# Patient Record
Sex: MALE | Race: WHITE | Employment: FULL TIME | URBAN - METROPOLITAN AREA
[De-identification: names, ages, dates, MRNs, and addresses within clinical notes are randomized per-mention and may not be internally consistent; named-entity substitution may affect disease eponyms.]

---

## 2017-08-17 ENCOUNTER — HOSPITAL ENCOUNTER (OUTPATIENT)
Dept: PHYSICAL THERAPY | Age: 47
Discharge: HOME | End: 2017-08-17
Payer: COMMERCIAL

## 2017-08-17 PROCEDURE — 97161 PT EVAL LOW COMPLEX 20 MIN: CPT

## 2017-08-17 NOTE — PROGRESS NOTES
Ambulatory/Rehab Services H2 Model Falls Risk Assessment    Risk Factor Pts. ·   Confusion/Disorientation/Impulsivity  []    4 ·   Symptomatic Depression  []   2 ·   Altered Elimination  []   1 ·   Dizziness/Vertigo  []   1 ·   Gender (Male)  [x]   1 ·   Any administered antiepileptics (anticonvulsants):  []   2 ·   Any administered benzodiazepines:  []   1 ·   Visual Impairment (specify):  []   1 ·   Portable Oxygen Use  []   1 ·   Orthostatic ? BP  []   1 ·   History of Recent Falls (within 3 mos.)  []   5     Ability to Rise from Chair (choose one) Pts. ·   Ability to rise in a single movement  [x]   0 ·   Pushes up, successful in one attempt  []   1 ·   Multiple attempts, but successful  []   3 ·   Unable to rise without assistance  []   4   Total: (5 or greater = High Risk) 1     Falls Prevention Plan:   []                Physical Limitations to Exercise (specify):   []                Mobility Assistance Device (type):   []                Exercise/Equipment Adaptation (specify):    ©2010 [facility] of [address] Patent #[id number].  Federal Law prohibits the replication, distribution or use without written permission from [facility] "[employer]

## 2017-08-22 ENCOUNTER — HOSPITAL ENCOUNTER (OUTPATIENT)
Dept: PHYSICAL THERAPY | Age: 47
Discharge: HOME | End: 2017-08-22
Payer: COMMERCIAL

## 2017-08-22 PROCEDURE — 97140 MANUAL THERAPY 1/> REGIONS: CPT

## 2017-08-22 PROCEDURE — 97110 THERAPEUTIC EXERCISES: CPT

## 2017-08-22 NOTE — PROGRESS NOTES
[place]  : 1970 [facility] at [address]  [phone]   [phone]       OUTPATIENT PHYSICAL THERAPY:Daily Note 2017    ICD-10: Treatment Diagnosis: low back pain (M54.5); Spondylosis without myelopathy or radiculopathy, lumbar region  Precautions/Allergies:   Review of patient's allergies indicates no known allergies. Fall Risk Score: 1 (? 5 = High Risk)  MD Orders: evaluate and treat  MEDICAL/REFERRING DIAGNOSIS:  Other intervertebral disc degeneration, lumbar region [M51.36]   DATE OF ONSET: May 2016   REFERRING PHYSICIAN: Cherelle Duke MD  RETURN PHYSICIAN APPOINTMENT: mid-September      INITIAL ASSESSMENT:  Mr. Patricia Hernandez is a  48year old male with low back pain that began in May 2016 with no specific mechanism or injury. He presents to PT with extension sensitive low back pain with decreased hip flexor mobility and decreased lumbar joint mobility. As a result, he reports pain with bending over and squatting activities. He requires skilled PT to address above listed impairments and functional limitations to facilitate return to prior level of function. PROBLEM LIST (Impacting functional limitations):  1. Increased Pain  2. Decreased Activity Tolerance  3. Decreased Flexibility/Joint Mobility  4. Decreased [place] with Home Exercise Program INTERVENTIONS PLANNED:  1. Cryotherapy  2. Heat  3. Home Exercise Program (HEP)  4. Neuromuscular Re-education/Strengthening  5. Range of Motion (ROM)  6. Therapeutic Activites  7. Therapeutic Exercise/Strengthening   TREATMENT PLAN:  Effective Dates: 17 TO 17. Frequency/Duration: 2 times a week for 6 weeks  GOALS: (Goals have been discussed and agreed upon with patient.)  Discharge Goals: Time Frame: 17  1. Pt will report minimal to no pain (0-1/10) upon waking in the AM with performance of his HEP. ONGOING  2.  Pt will report no limitation due to pain with bending or squatting to perform hobby activities (planing wood) or dressing (putting on pants). ONGOING  3. Pt will demonstrate functional improvement with MARIA INES to 6% or less. ONGOING  Rehabilitation Potential For Stated Goals: Good  Regarding Zulma Ordonez therapy, I certify that the treatment plan above will be carried out by a therapist or under their direction. Thank you for this referral,  Malathi Mcelroy, ROBYN     Referring Physician Signature: Shane Wilder MD              Date                      HISTORY:   History of Present Injury/Illness (Reason for Referral):  8/17/17: Pt reports low back pain that began in May 2016 with no specific mechanism or injury. He reports having tried increasing activity level with beginning an interval workout program and increasing his core exercises with no improvement in pain. He reports pain is worst when he wakes in the AM and with bending/squatting activities. He reports that pain in the AM (4/10) improves by about 9:00 am but that he has trouble bending over to put his pants on as a result of his pain. He reports that pain decreases to 0-1/10 during the day unless he performs an aggravating activity such as bending down to perform wood planing and then it takes 1 hour to aggravate pain and about 20-30 minutes to alleviate after he stops the activity. He reports no increased symptoms with workout, running, or work activities. Past Medical History/Comorbidities:   Mr. Huy Hendrickson  has a past medical history of GERD (gastroesophageal reflux disease) and RLS (restless legs syndrome). Mr. Huy Hendrickson  has a past surgical history that includes vasectomy (2005); heent (1998); and refractive surgery. Social History/Living Environment:     Pt lives in private home with spouse and 4 kids. Prior Level of Function/Work/Activity:  Pt works as  with job requiring desk work and walking around.    Current Medications: Current Outpatient Prescriptions:     sertraline (ZOLOFT) 50 mg tablet, Take 1 Tab by mouth daily. , Disp: 30 Tab, Rfl: 3    clonazePAM (KLONOPIN) 0.5 mg tablet, 1 tab PO BID Prn restless legs, Disp: 60 Tab, Rfl: 5    pramipexole (MIRAPEX) 0.5 mg tablet, Take 1 Tab by mouth three (3) times daily. , Disp: 90 Tab, Rfl: 1    meloxicam (MOBIC) 15 mg tablet, Take 1 Tab by mouth daily. , Disp: 30 Tab, Rfl: 1    selenium sulfide (SELSUN BLUE) 1 % shampoo, Use as needed for scalp, Disp: 325 mL, Rfl: 5   Date Last Reviewed:  8/22/2017     Number of Personal Factors/Comorbidities that affect the Plan of Care: 0: LOW COMPLEXITY   EXAMINATION:   Observation/Palpation: Hypomobile and painful with PA  To L3-5. Decreased hip flexor length bilaterally with reproduction of pain during testing     OM:       Flexion WNL--pain that decreased with repetition   Extension WNL--pain that increased with repetition   SBL WNL   SBR WNL   Rot L WNL   Rot R  WNL     Strength: WNL LQS, Poor core control demonstrated during squat and demonstration of cat/camel     Neurological Screen: Reflexes not tested , SLR negative , Slump negative     (SB=sidebending, Rot=rotation, TTP=tender to palpation, SLR=straight leg raise, LQS=lower quarter scan, WNL=within normal limits; ROM measured in degrees)     Body Structures Involved:  1. Joints  2. Muscles Body Functions Affected:  1. Sensory/Pain  2. Neuromusculoskeletal  3. Movement Related Activities and Participation Affected:  1. Learning and Applying Knowledge  2. General Tasks and Demands  3. Self Care   Number of elements (examined above) that affect the Plan of Care: 4+: HIGH COMPLEXITY   CLINICAL PRESENTATION:   Presentation: Stable and uncomplicated: LOW COMPLEXITY   CLINICAL DECISION MAKING:   Outcome Measure:    Tool Used: Modified Oswestry Low Back Pain Questionnaire  Score:  Initial: 7/50 (14% disability)   Most Recent: X/50 (Date: -- )   Interpretation of Score: Each section is scored on a 0-5 scale, 5 representing the greatest disability. The scores of each section are added together for a total score of 50. Medical Necessity:   · Patient is expected to demonstrate progress in strength and range of motion to increase independence with bending . Reason for Services/Other Comments:  · Patient continues to require present interventions due to patient's inability to bend and lift without pain . Use of outcome tool(s) and clinical judgement create a POC that gives a: Clear prediction of patient's progress: LOW COMPLEXITY            TREATMENT:   (In addition to Assessment/Re-Assessment sessions the following treatments were rendered)  Pre-treatment Symptoms/Complaints:  Pt reports that he has not noticed much difference in his back pain yet and has been doing his stretches. Pain: Initial: Pain Intensity 1: 0  Post Session:  0/10      Manual Therapy (    Soft Tissue Mobilization Duration  Duration: 10 Minutes): Manual techniques to facilitate improved motion and decreased pain. · Long axis traction each side     Therapeutic Exercise: (40 Minutes):  Exercises per grid below to improve mobility and strength. Required minimal verbal cues to promote proper body mechanics. Progressed resistance, range and repetitions as indicated. 8/17/17 8/22/17    Activity/Exercise Parameters               Parameters Parameters   Patient education Plan of care, expectations, HEP with KTC, LE rocking, and hip flexor stretch  --                                                      Bike  -- 5 minutes     Lumbar ROM  -- KTC, LE rocking, cat/camel, child's pose     Posterior pelvic tilt (PPT)  -- 2 x 10 regular, 2 x 10 on dynadisc     Marching  -- With PPT and with heel slides     Hip hinge/squat -- 2 x 10 each     Hip flexor stretch  -- Half kneeling, 3 x 30 sec each         Treatment/Session Assessment:    · Response to Treatment: Pt with good tolerance with initiation of flexion exercises today.    Pt with improved ability to perform cat/camel today compared to last visit, but is still very limited in achieving flexion postures. Plan to continue to facilitate as tolerated. · Compliance with Program/Exercises: compliant most of the time. · Recommendations/Intent for next treatment session: \"Next visit will focus on advancements to more challenging activities\".   Total Treatment Duration:  PT Patient Time In/Time Out  Time In: 0830  Time Out: 0920    Tyrone Agustin PT

## 2017-08-24 ENCOUNTER — HOSPITAL ENCOUNTER (OUTPATIENT)
Dept: PHYSICAL THERAPY | Age: 47
Discharge: HOME | End: 2017-08-24
Payer: COMMERCIAL

## 2017-08-24 PROCEDURE — 97110 THERAPEUTIC EXERCISES: CPT

## 2017-08-24 NOTE — PROGRESS NOTES
[place]  : 1970 [facility] Center at [address] [place] [address]  [phone]   [phone]       OUTPATIENT PHYSICAL THERAPY:Daily Note 2017    ICD-10: Treatment Diagnosis: low back pain (M54.5); Spondylosis without myelopathy or radiculopathy, lumbar region  Precautions/Allergies:   Review of patient's allergies indicates no known allergies. Fall Risk Score: 1 (? 5 = High Risk)  MD Orders: evaluate and treat  MEDICAL/REFERRING DIAGNOSIS:  Other intervertebral disc degeneration, lumbar region [M51.36]   DATE OF ONSET: May 2016   REFERRING PHYSICIAN: Yuli Dang MD  RETURN PHYSICIAN APPOINTMENT: mid-September      INITIAL ASSESSMENT:  Mr. Mercedes Hughes is a  48year old male with low back pain that began in May 2016 with no specific mechanism or injury. He presents to PT with extension sensitive low back pain with decreased hip flexor mobility and decreased lumbar joint mobility. As a result, he reports pain with bending over and squatting activities. He requires skilled PT to address above listed impairments and functional limitations to facilitate return to prior level of function. PROBLEM LIST (Impacting functional limitations):  1. Increased Pain  2. Decreased Activity Tolerance  3. Decreased Flexibility/Joint Mobility  4. Decreased [place] with Home Exercise Program INTERVENTIONS PLANNED:  1. Cryotherapy  2. Heat  3. Home Exercise Program (HEP)  4. Neuromuscular Re-education/Strengthening  5. Range of Motion (ROM)  6. Therapeutic Activites  7. Therapeutic Exercise/Strengthening   TREATMENT PLAN:  Effective Dates: 17 TO 17. Frequency/Duration: 2 times a week for 6 weeks  GOALS: (Goals have been discussed and agreed upon with patient.)  Discharge Goals: Time Frame: 17  1. Pt will report minimal to no pain (0-1/10) upon waking in the AM with performance of his HEP. ONGOING  2.  Pt will report no limitation due to pain with bending or squatting to perform hobby activities (planing wood) or dressing (putting on pants). ONGOING  3. Pt will demonstrate functional improvement with MARIA INES to 6% or less. ONGOING  Rehabilitation Potential For Stated Goals: Good  Regarding Tabatha Cheese therapy, I certify that the treatment plan above will be carried out by a therapist or under their direction. Thank you for this referral,  Ashwini Lockett PT     Referring Physician Signature: Martha Pak MD              Date                      HISTORY:   History of Present Injury/Illness (Reason for Referral):  8/17/17: Pt reports low back pain that began in May 2016 with no specific mechanism or injury. He reports having tried increasing activity level with beginning an interval workout program and increasing his core exercises with no improvement in pain. He reports pain is worst when he wakes in the AM and with bending/squatting activities. He reports that pain in the AM (4/10) improves by about 9:00 am but that he has trouble bending over to put his pants on as a result of his pain. He reports that pain decreases to 0-1/10 during the day unless he performs an aggravating activity such as bending down to perform wood planing and then it takes 1 hour to aggravate pain and about 20-30 minutes to alleviate after he stops the activity. He reports no increased symptoms with workout, running, or work activities. Past Medical History/Comorbidities:   Mr. Paulina Clark  has a past medical history of GERD (gastroesophageal reflux disease) and RLS (restless legs syndrome). Mr. Paulina Clark  has a past surgical history that includes vasectomy (2005); heent (1998); and refractive surgery. Social History/Living Environment:     Pt lives in private home with spouse and 4 kids. Prior Level of Function/Work/Activity:  Pt works as  with job requiring desk work and walking around.    Current Medications: Current Outpatient Prescriptions:     sertraline (ZOLOFT) 50 mg tablet, Take 1 Tab by mouth daily. , Disp: 30 Tab, Rfl: 3    clonazePAM (KLONOPIN) 0.5 mg tablet, 1 tab PO BID Prn restless legs, Disp: 60 Tab, Rfl: 5    pramipexole (MIRAPEX) 0.5 mg tablet, Take 1 Tab by mouth three (3) times daily. , Disp: 90 Tab, Rfl: 1    meloxicam (MOBIC) 15 mg tablet, Take 1 Tab by mouth daily. , Disp: 30 Tab, Rfl: 1    selenium sulfide (SELSUN BLUE) 1 % shampoo, Use as needed for scalp, Disp: 325 mL, Rfl: 5   Date Last Reviewed:  8/24/2017     Number of Personal Factors/Comorbidities that affect the Plan of Care: 0: LOW COMPLEXITY   EXAMINATION:   Observation/Palpation: Hypomobile and painful with PA  To L3-5. Decreased hip flexor length bilaterally with reproduction of pain during testing     OM:       Flexion WNL--pain that decreased with repetition   Extension WNL--pain that increased with repetition   SBL WNL   SBR WNL   Rot L WNL   Rot R  WNL     Strength: WNL LQS, Poor core control demonstrated during squat and demonstration of cat/camel     Neurological Screen: Reflexes not tested , SLR negative , Slump negative     (SB=sidebending, Rot=rotation, TTP=tender to palpation, SLR=straight leg raise, LQS=lower quarter scan, WNL=within normal limits; ROM measured in degrees)     Body Structures Involved:  1. Joints  2. Muscles Body Functions Affected:  1. Sensory/Pain  2. Neuromusculoskeletal  3. Movement Related Activities and Participation Affected:  1. Learning and Applying Knowledge  2. General Tasks and Demands  3. Self Care   Number of elements (examined above) that affect the Plan of Care: 4+: HIGH COMPLEXITY   CLINICAL PRESENTATION:   Presentation: Stable and uncomplicated: LOW COMPLEXITY   CLINICAL DECISION MAKING:   Outcome Measure:    Tool Used: Modified Oswestry Low Back Pain Questionnaire  Score:  Initial: 7/50 (14% disability)   Most Recent: X/50 (Date: -- )   Interpretation of Score: Each section is scored on a 0-5 scale, 5 representing the greatest disability. The scores of each section are added together for a total score of 50. Medical Necessity:   · Patient is expected to demonstrate progress in strength and range of motion to increase independence with bending . Reason for Services/Other Comments:  · Patient continues to require present interventions due to patient's inability to bend and lift without pain . Use of outcome tool(s) and clinical judgement create a POC that gives a: Clear prediction of patient's progress: LOW COMPLEXITY            TREATMENT:   (In addition to Assessment/Re-Assessment sessions the following treatments were rendered)  Pre-treatment Symptoms/Complaints:  Pt reports that he is doing okay this AM and that he has to leave early today due to a work conflict. Pain: Initial: Pain Intensity 1: 0  Post Session:  0/10      Manual Therapy (    Soft Tissue Mobilization Duration  Duration: 10 Minutes): Manual techniques to facilitate improved motion and decreased pain. · Long axis traction each side     Therapeutic Exercise: (10 Minutes):  Exercises per grid below to improve mobility and strength. Required minimal verbal cues to promote proper body mechanics. Progressed resistance, range and repetitions as indicated.      8/17/17 8/22/17 8/24/17   Activity/Exercise Parameters               Parameters Parameters   Patient education Plan of care, expectations, HEP with KTC, LE rocking, and hip flexor stretch  --                                                   Reviewed squat form    Bike  -- 5 minutes  4 minutes    Lumbar ROM  -- KTC, LE rocking, cat/camel, child's pose  Child's pose, quadruped rotation    Posterior pelvic tilt (PPT)  -- 2 x 10 regular, 2 x 10 on dynadisc  5 reps alone and 3 x 10 with ball rolls    Marching  -- With PPT and with heel slides  --   Hip hinge/squat -- 2 x 10 each  --   Hip flexor stretch  -- Half kneeling, 3 x 30 sec each  --       Treatment/Session Assessment:    · Response to Treatment: Pt with shortened treatment today due to work conflict. Good maintenance of ability to perform posterior pelvic tilt. · Compliance with Program/Exercises: compliant most of the time. · Recommendations/Intent for next treatment session: \"Next visit will focus on advancements to more challenging activities\".   Total Treatment Duration:  PT Patient Time In/Time Out  Time In: 0830  Time Out: 1835    Mar Wang, PT

## 2017-08-30 ENCOUNTER — HOSPITAL ENCOUNTER (OUTPATIENT)
Dept: PHYSICAL THERAPY | Age: 47
Discharge: HOME | End: 2017-08-30
Payer: COMMERCIAL

## 2017-08-30 PROCEDURE — 97110 THERAPEUTIC EXERCISES: CPT

## 2017-08-30 PROCEDURE — 97140 MANUAL THERAPY 1/> REGIONS: CPT

## 2017-08-30 NOTE — PROGRESS NOTES
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 Fall Risk Score: 1 (? 5 = High Risk)  MD Orders: evaluate and treat  MEDICAL/REFERRING DIAGNOSIS:  Other intervertebral disc degeneration, lumbar region [M51.36]   DATE OF ONSET: May 2016   REFERRING PHYSICIAN: Xenia Dominguez MD  RETURN PHYSICIAN APPOINTMENT: mid-September      INITIAL ASSESSMENT:  Mr. Eddie Larkin is a  48year old male with low back pain that began in May 2016 with no specific mechanism or injury. He presents to PT with extension sensitive low back pain with decreased hip flexor mobility and decreased lumbar joint mobility. As a result, he reports pain with bending over and squatting activities. He requires skilled PT to address above listed impairments and functional limitations to facilitate return to prior level of function. PROBLEM LIST (Impacting functional limitations):  1. Increased Pain  2. Decreased Activity Tolerance  3. Decreased Flexibility/Joint Mobility  4. Decreased [place] with Home Exercise Program INTERVENTIONS PLANNED:  1. Cryotherapy  2. Heat  3. Home Exercise Program (HEP)  4. Neuromuscular Re-education/Strengthening  5. Range of Motion (ROM)  6. Therapeutic Activites  7. Therapeutic Exercise/Strengthening   TREATMENT PLAN:  Effective Dates: 17 TO 17. Frequency/Duration: 2 times a week for 6 weeks  GOALS: (Goals have been discussed and agreed upon with patient.)  Discharge Goals: Time Frame: 17  1. Pt will report minimal to no pain (0-1/10) upon waking in the AM with performance of his HEP. ONGOING  2.  Pt will report no limitation due to pain with bending or squatting to perform hobby activities (planing wood) or dressing (putting on pants). ONGOING  3. Pt will demonstrate functional improvement with MARIA INES to 6% or less. ONGOING  Rehabilitation Potential For Stated Goals: Good  Regarding Cam[facility] York therapy, I certify that the treatment plan above will be carried out by a therapist or under their direction. Thank you for this referral,  Isabela Clemons, PT     Referring Physician Signature: Bebo Pemberton MD              Date                      HISTORY:   History of Present Injury/Illness (Reason for Referral):  8/17/17: Pt reports low back pain that began in May 2016 with no specific mechanism or injury. He reports having tried increasing activity level with beginning an interval workout program and increasing his core exercises with no improvement in pain. He reports pain is worst when he wakes in the AM and with bending/squatting activities. He reports that pain in the AM (4/10) improves by about 9:00 am but that he has trouble bending over to put his pants on as a result of his pain. He reports that pain decreases to 0-1/10 during the day unless he performs an aggravating activity such as bending down to perform wood planing and then it takes 1 hour to aggravate pain and about 20-30 minutes to alleviate after he stops the activity. He reports no increased symptoms with workout, running, or work activities. Past Medical History/Comorbidities:   Mr. Malik Cleaning  has a past medical history of GERD (gastroesophageal reflux disease) and RLS (restless legs syndrome). Mr. Malik Cleaning  has a past surgical history that includes vasectomy (2005); heent (1998); and refractive surgery. Social History/Living Environment:     Pt lives in private home with spouse and 4 kids. Prior Level of Function/Work/Activity:  Pt works as  with job requiring desk work and walking around.    Current Medications: Current Outpatient Prescriptions:     sertraline (ZOLOFT) 50 mg tablet, Take 1 Tab by mouth daily. , Disp: 30 Tab, Rfl: 3    clonazePAM (KLONOPIN) 0.5 mg tablet, 1 tab PO BID Prn restless legs, Disp: 60 Tab, Rfl: 5    pramipexole (MIRAPEX) 0.5 mg tablet, Take 1 Tab by mouth three (3) times daily. , Disp: 90 Tab, Rfl: 1    meloxicam (MOBIC) 15 mg tablet, Take 1 Tab by mouth daily. , Disp: 30 Tab, Rfl: 1    selenium sulfide (SELSUN BLUE) 1 % shampoo, Use as needed for scalp, Disp: 325 mL, Rfl: 5   Date Last Reviewed:  8/30/2017     Number of Personal Factors/Comorbidities that affect the Plan of Care: 0: LOW COMPLEXITY   EXAMINATION:   Observation/Palpation: Hypomobile and painful with PA  To L3-5. Decreased hip flexor length bilaterally with reproduction of pain during testing     OM:       Flexion WNL--pain that decreased with repetition   Extension WNL--pain that increased with repetition   SBL WNL   SBR WNL   Rot L WNL   Rot R  WNL     Strength: WNL LQS, Poor core control demonstrated during squat and demonstration of cat/camel     Neurological Screen: Reflexes not tested , SLR negative , Slump negative     (SB=sidebending, Rot=rotation, TTP=tender to palpation, SLR=straight leg raise, LQS=lower quarter scan, WNL=within normal limits; ROM measured in degrees)     Body Structures Involved:  1. Joints  2. Muscles Body Functions Affected:  1. Sensory/Pain  2. Neuromusculoskeletal  3. Movement Related Activities and Participation Affected:  1. Learning and Applying Knowledge  2. General Tasks and Demands  3. Self Care   Number of elements (examined above) that affect the Plan of Care: 4+: HIGH COMPLEXITY   CLINICAL PRESENTATION:   Presentation: Stable and uncomplicated: LOW COMPLEXITY   CLINICAL DECISION MAKING:   Outcome Measure:    Tool Used: Modified Oswestry Low Back Pain Questionnaire  Score:  Initial: 7/50 (14% disability)   Most Recent: X/50 (Date: -- )   Interpretation of Score: Each section is scored on a 0-5 scale, 5 representing the greatest disability. The scores of each section are added together for a total score of 50. Medical Necessity:   · Patient is expected to demonstrate progress in strength and range of motion to increase independence with bending . Reason for Services/Other Comments:  · Patient continues to require present interventions due to patient's inability to bend and lift without pain . Use of outcome tool(s) and clinical judgement create a POC that gives a: Clear prediction of patient's progress: LOW COMPLEXITY            TREATMENT:   (In addition to Assessment/Re-Assessment sessions the following treatments were rendered)  Pre-treatment Symptoms/Complaints:  Pt reports that he has not noticed stiffness in his back the last few days. Pain: Initial: Pain Intensity 1: 0  Post Session:  0/10      Manual Therapy (    Soft Tissue Mobilization Duration  Duration: 10 Minutes): Manual techniques to facilitate improved motion and decreased pain. · Long axis traction each side     Therapeutic Exercise: (50 Minutes):  Exercises per grid below to improve mobility and strength. Required minimal verbal cues to promote proper body mechanics. Progressed resistance, range and repetitions as indicated.      8/24/17 8/30/17    Activity/Exercise Parameters     Patient education Reviewed squat form  --    Bike  4 minutes  5 minutes     Lumbar ROM  Child's pose, quadruped rotation  Child's pose, cat/camel, KTC    Posterior pelvic tilt (PPT)  5 reps alone and 3 x 10 with ball rolls  10 reps alone, 2 x 10 with ball rolls     Marching  -- 10 marching, 10 each side SLR    Hip hinge/squat -- 10 hip hinge with cuing, 10 without, 2 x 10 squat without cuing    Hip flexor stretch  -- 3 x 30 sec each side     SLR abduction  -- 2 x 10 each side     Multifidus walkouts  -- Total gym 5 lbs: 2 x 10 each side     Multifidus rotations  -- Total gym 5 lbs: 2 x 10 each side    Sidelying rotational stretch  -- 2 minutes each side         Treatment/Session Assessment:    · Response to Treatment: Pt with some difficulty remembering how to perform posterior pelvic tilt in supine but improved in cat/camel position. Good progression of symptoms. · Compliance with Program/Exercises: compliant most of the time. · Recommendations/Intent for next treatment session: \"Next visit will focus on advancements to more challenging activities\".   Total Treatment Duration:  PT Patient Time In/Time Out  Time In: 0830  Time Out: 0930    Isabela Clemons, PT

## 2017-09-07 ENCOUNTER — HOSPITAL ENCOUNTER (OUTPATIENT)
Dept: PHYSICAL THERAPY | Age: 47
Discharge: HOME | End: 2017-09-07

## 2017-09-07 NOTE — PROGRESS NOTES
Valeriano Hammer   (:1970) [facility] at  [facility] [facility]  [phone]  [phone]           DATE: 2017    Patient canceled for appointment today due to conflict with doctor's appointment. Will plan to follow up on next scheduled visit.       Lion Guardado, PT, DPT, OCS

## 2017-09-11 ENCOUNTER — APPOINTMENT (OUTPATIENT)
Dept: PHYSICAL THERAPY | Age: 47
End: 2017-09-11

## 2017-10-09 NOTE — PROGRESS NOTES
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 Fall Risk Score: 1 (? 5 = High Risk)  MD Orders: evaluate and treat  MEDICAL/REFERRING DIAGNOSIS:  Other intervertebral disc degeneration, lumbar region [M51.36]   DATE OF ONSET: May 2016   REFERRING PHYSICIAN: Vi Sepulveda MD  RETURN PHYSICIAN APPOINTMENT: mid-September      INITIAL ASSESSMENT:  Mr. Wilmer Calderon is a  48year old male with low back pain that began in May 2016 with no specific mechanism or injury. He presents to PT with extension sensitive low back pain with decreased hip flexor mobility and decreased lumbar joint mobility. As a result, he reports pain with bending over and squatting activities. He requires skilled PT to address above listed impairments and functional limitations to facilitate return to prior level of function. 10/9/17: Following attending 4 therapy visits, Mr Wilmer Calderon discontinued attending therapy. As a result, he is discharged without achieving his therapy goals. PROBLEM LIST (Impacting functional limitations):  1. Increased Pain  2. Decreased Activity Tolerance  3. Decreased Flexibility/Joint Mobility  4. Decreased [place] with Home Exercise Program INTERVENTIONS PLANNED:  1. Cryotherapy  2. Heat  3. Home Exercise Program (HEP)  4. Neuromuscular Re-education/Strengthening  5. Range of Motion (ROM)  6. Therapeutic Activites  7. Therapeutic Exercise/Strengthening   TREATMENT PLAN:  Effective Dates: 17 TO 17.   Frequency/Duration: 2 times a week for 6 weeks  GOALS: (Goals have been discussed and agreed upon with patient.)  Discharge Goals: Time Frame: 9/28/17  1. Pt will report minimal to no pain (0-1/10) upon waking in the AM with performance of his HEP. Did not achieve   2. Pt will report no limitation due to pain with bending or squatting to perform hobby activities (planing wood) or dressing (putting on pants). Did not achieve   3. Pt will demonstrate functional improvement with MARIA INES to 6% or less. Did not achieve   Rehabilitation Potential For Stated Goals: Good              HISTORY:   History of Present Injury/Illness (Reason for Referral):  8/17/17: Pt reports low back pain that began in May 2016 with no specific mechanism or injury. He reports having tried increasing activity level with beginning an interval workout program and increasing his core exercises with no improvement in pain. He reports pain is worst when he wakes in the AM and with bending/squatting activities. He reports that pain in the AM (4/10) improves by about 9:00 am but that he has trouble bending over to put his pants on as a result of his pain. He reports that pain decreases to 0-1/10 during the day unless he performs an aggravating activity such as bending down to perform wood planing and then it takes 1 hour to aggravate pain and about 20-30 minutes to alleviate after he stops the activity. He reports no increased symptoms with workout, running, or work activities. Past Medical History/Comorbidities:   Mr. Maryan Ham  has a past medical history of GERD (gastroesophageal reflux disease) and RLS (restless legs syndrome). Mr. Maryan Ham  has a past surgical history that includes vasectomy (2005); heent (1998); and refractive surgery. Social History/Living Environment:     Pt lives in private home with spouse and 4 kids. Prior Level of Function/Work/Activity:  Pt works as  with job requiring desk work and walking around.    Current Medications:       Current Outpatient Prescriptions:     sertraline (ZOLOFT) 50 mg tablet, Take 1 Tab by mouth daily. , Disp: 30 Tab, Rfl: 3    clonazePAM (KLONOPIN) 0.5 mg tablet, 1 tab PO BID Prn restless legs, Disp: 60 Tab, Rfl: 5    pramipexole (MIRAPEX) 0.5 mg tablet, Take 1 Tab by mouth three (3) times daily. , Disp: 90 Tab, Rfl: 1    meloxicam (MOBIC) 15 mg tablet, Take 1 Tab by mouth daily. , Disp: 30 Tab, Rfl: 1    selenium sulfide (SELSUN BLUE) 1 % shampoo, Use as needed for scalp, Disp: 325 mL, Rfl: 5   Date Last Reviewed:  10/9/2017     Number of Personal Factors/Comorbidities that affect the Plan of Care: 0: LOW COMPLEXITY   EXAMINATION:   Observation/Palpation: Hypomobile and painful with PA  To L3-5. Decreased hip flexor length bilaterally with reproduction of pain during testing     OM:       Flexion WNL--pain that decreased with repetition   Extension WNL--pain that increased with repetition   SBL WNL   SBR WNL   Rot L WNL   Rot R  WNL     Strength: WNL LQS, Poor core control demonstrated during squat and demonstration of cat/camel     Neurological Screen: Reflexes not tested , SLR negative , Slump negative     (SB=sidebending, Rot=rotation, TTP=tender to palpation, SLR=straight leg raise, LQS=lower quarter scan, WNL=within normal limits; ROM measured in degrees)     Body Structures Involved:  1. Joints  2. Muscles Body Functions Affected:  1. Sensory/Pain  2. Neuromusculoskeletal  3. Movement Related Activities and Participation Affected:  1. Learning and Applying Knowledge  2. General Tasks and Demands  3. Self Care   Number of elements (examined above) that affect the Plan of Care: 4+: HIGH COMPLEXITY   CLINICAL PRESENTATION:   Presentation: Stable and uncomplicated: LOW COMPLEXITY   CLINICAL DECISION MAKING:   Outcome Measure: Tool Used: Modified Oswestry Low Back Pain Questionnaire  Score:  Initial: 7/50 (14% disability)   Most Recent: X/50 (Date: -- )   Interpretation of Score: Each section is scored on a 0-5 scale, 5 representing the greatest disability.   The scores of each section are added together for a total score of 50. Medical Necessity:   · Patient is expected to demonstrate progress in strength and range of motion to increase independence with bending . Reason for Services/Other Comments:  · Patient continues to require present interventions due to patient's inability to bend and lift without pain .    Use of outcome tool(s) and clinical judgement create a POC that gives a: Clear prediction of patient's progress: LOW COMPLEXITY                 Sly Jackson, PT

## 2023-03-01 ENCOUNTER — APPOINTMENT (RX ONLY)
Dept: URBAN - METROPOLITAN AREA CLINIC 330 | Facility: CLINIC | Age: 53
Setting detail: DERMATOLOGY
End: 2023-03-01

## 2023-03-01 DIAGNOSIS — L57.8 OTHER SKIN CHANGES DUE TO CHRONIC EXPOSURE TO NONIONIZING RADIATION: ICD-10-CM | Status: STABLE

## 2023-03-01 DIAGNOSIS — L73.8 OTHER SPECIFIED FOLLICULAR DISORDERS: ICD-10-CM | Status: STABLE

## 2023-03-01 DIAGNOSIS — L82.1 OTHER SEBORRHEIC KERATOSIS: ICD-10-CM | Status: STABLE

## 2023-03-01 DIAGNOSIS — D22 MELANOCYTIC NEVI: ICD-10-CM | Status: STABLE

## 2023-03-01 DIAGNOSIS — L82.0 INFLAMED SEBORRHEIC KERATOSIS: ICD-10-CM

## 2023-03-01 DIAGNOSIS — Z71.89 OTHER SPECIFIED COUNSELING: ICD-10-CM

## 2023-03-01 PROBLEM — D22.72 MELANOCYTIC NEVI OF LEFT LOWER LIMB, INCLUDING HIP: Status: ACTIVE | Noted: 2023-03-01

## 2023-03-01 PROBLEM — D22.5 MELANOCYTIC NEVI OF TRUNK: Status: ACTIVE | Noted: 2023-03-01

## 2023-03-01 PROCEDURE — 11301 SHAVE SKIN LESION 0.6-1.0 CM: CPT

## 2023-03-01 PROCEDURE — ? LIQUID NITROGEN

## 2023-03-01 PROCEDURE — ? EDUCATIONAL RESOURCES PROVIDED

## 2023-03-01 PROCEDURE — ? ADDITIONAL NOTES

## 2023-03-01 PROCEDURE — 99203 OFFICE O/P NEW LOW 30 MIN: CPT | Mod: 25

## 2023-03-01 PROCEDURE — ? BENIGN DESTRUCTION COSMETIC

## 2023-03-01 PROCEDURE — 17110 DESTRUCTION B9 LES UP TO 14: CPT | Mod: 59

## 2023-03-01 PROCEDURE — ? PRESCRIPTION

## 2023-03-01 PROCEDURE — ? PRESCRIPTION MEDICATION MANAGEMENT

## 2023-03-01 PROCEDURE — ? SHAVE REMOVAL

## 2023-03-01 PROCEDURE — ? TREATMENT REGIMEN

## 2023-03-01 PROCEDURE — ? COUNSELING

## 2023-03-01 PROCEDURE — ? FULL BODY SKIN EXAM

## 2023-03-01 PROCEDURE — ? BODY PHOTOGRAPHY

## 2023-03-01 RX ORDER — TRETIONIN 0.5 MG/G
CREAM TOPICAL
Qty: 45 | Refills: 3 | Status: ERX | COMMUNITY
Start: 2023-03-01

## 2023-03-01 RX ADMIN — TRETIONIN: 0.5 CREAM TOPICAL at 00:00

## 2023-03-01 ASSESSMENT — LOCATION SIMPLE DESCRIPTION DERM
LOCATION SIMPLE: LEFT CHEEK
LOCATION SIMPLE: RIGHT FOREHEAD
LOCATION SIMPLE: RIGHT UPPER BACK
LOCATION SIMPLE: RIGHT CHEEK
LOCATION SIMPLE: LEFT THIGH
LOCATION SIMPLE: LEFT FOREHEAD
LOCATION SIMPLE: UPPER BACK
LOCATION SIMPLE: LEFT UPPER BACK
LOCATION SIMPLE: GROIN

## 2023-03-01 ASSESSMENT — LOCATION ZONE DERM
LOCATION ZONE: TRUNK
LOCATION ZONE: LEG
LOCATION ZONE: FACE

## 2023-03-01 ASSESSMENT — LOCATION DETAILED DESCRIPTION DERM
LOCATION DETAILED: LEFT INFERIOR CENTRAL MALAR CHEEK
LOCATION DETAILED: LEFT MEDIAL UPPER BACK
LOCATION DETAILED: SUPRAPUBIC SKIN
LOCATION DETAILED: RIGHT SUPERIOR UPPER BACK
LOCATION DETAILED: LEFT ANTERIOR DISTAL THIGH
LOCATION DETAILED: RIGHT CENTRAL MALAR CHEEK
LOCATION DETAILED: INFERIOR THORACIC SPINE
LOCATION DETAILED: RIGHT LATERAL FOREHEAD
LOCATION DETAILED: LEFT LATERAL FOREHEAD

## 2023-03-01 NOTE — PROCEDURE: BENIGN DESTRUCTION COSMETIC
Post-Care Instructions: I reviewed with the patient in detail post-care instructions. Patient is to wear sunprotection, and avoid picking at any of the treated lesions. Pt may apply Vaseline to crusted or scabbing areas.
Price (Use Numbers Only, No Special Characters Or $): 20
Consent: The patient's consent was obtained including but not limited to risks of crusting, scabbing, blistering, scarring, darker or lighter pigmentary change, recurrence, incomplete removal and infection.
Detail Level: Detailed
Anesthesia Volume In Cc: 0.5

## 2023-03-01 NOTE — HPI: EVALUATION OF SKIN LESION(S)
Hpi Title: Evaluation of Skin Lesions
How Severe Are Your Spot(S)?: mild
Additional History: Patient has a new lesion that popped up in January on his left temple. He states that he also has milia that he wants to treat.

## 2023-03-01 NOTE — PROCEDURE: PRESCRIPTION MEDICATION MANAGEMENT
Render In Strict Bullet Format?: No
Detail Level: Simple
Initiate Treatment: Tretinoin .05 apply to face nightly, start slow and build tolerance

## 2023-03-01 NOTE — PROCEDURE: SHAVE REMOVAL
Medical Necessity Information: It is in your best interest to select a reason for this procedure from the list below. All of these items fulfill various CMS LCD requirements except the new and changing color options.
Medical Necessity Clause: This procedure was medically necessary because the lesion that was treated was:
Lab: 6
Lab Facility: 3
Detail Level: Detailed
Was A Bandage Applied: Yes
Size Of Lesion In Cm (Required): 0.7
X Size Of Lesion In Cm (Optional): 0
Depth Of Shave: dermis
Biopsy Method: Dermablade
Anesthesia Type: 1% lidocaine with epinephrine
Hemostasis: Drysol
Wound Care: Petrolatum
Render Path Notes In Note?: No
Consent was obtained from the patient. The risks and benefits to therapy were discussed in detail. Specifically, the risks of infection, scarring, bleeding, prolonged wound healing, incomplete removal, allergy to anesthesia, nerve injury and recurrence were addressed. Prior to the procedure, the treatment site was clearly identified and confirmed by the patient. All components of Universal Protocol/PAUSE Rule completed.
Post-Care Instructions: I reviewed with the patient in detail post-care instructions. Patient is to keep the biopsy site dry overnight, and then apply bacitracin twice daily until healed. Patient may apply hydrogen peroxide soaks to remove any crusting.
Notification Instructions: Patient will be notified of pathology results. However, patient instructed to call the office if not contacted within 2 weeks.
Billing Type: Third-Party Bill

## 2023-03-01 NOTE — PROCEDURE: ADDITIONAL NOTES
Additional Notes: Discussed removal with electrodessication quoted patient $115 for 1-15 lesions. Advised patient that he can try a few spots for $10 a spot to ensure that this treatment will work for him. Discussed using tretinoin to shrink the lesions. Patient is in agreement with plan.
Detail Level: Simple
Render Risk Assessment In Note?: no

## 2023-03-01 NOTE — PROCEDURE: LIQUID NITROGEN
Show Spray Paint Technique Variable?: Yes
Detail Level: Detailed
Medical Necessity Clause: This procedure was medically necessary because the lesions that were treated were:
Render Post-Care Instructions In Note?: no
Post-Care Instructions: I reviewed with the patient in detail post-care instructions. Patient is to wear sunprotection, and avoid picking at any of the treated lesions. Pt may apply Vaseline to crusted or scabbing areas.
Spray Paint Text: The liquid nitrogen was applied to the skin utilizing a spray paint frosting technique.
Number Of Freeze-Thaw Cycles: 1 freeze-thaw cycle
Medical Necessity Information: It is in your best interest to select a reason for this procedure from the list below. All of these items fulfill various CMS LCD requirements except the new and changing color options.
Consent: The patient's consent was obtained including but not limited to risks of crusting, scabbing, blistering, scarring, darker or lighter pigmentary change, recurrence, incomplete removal and infection.

## 2023-03-01 NOTE — PROCEDURE: BODY PHOTOGRAPHY
Whole Body Statement: The whole body was photographed today.
Detail Level: Detailed
Consent was obtained for whole body photography. Patient understands that photograph costs may not be covered by insurance.
Number Of Photographs (Optional- Will Not Render If 0): 2
Reason For Photography: The patient is obtaining whole body photography to observe existing suspicious moles and or monitor for the appearance of any new lesions.
Was The Entire Body Photographed (Cannot Bill Unless Entire Body Photographed)?: Please Select Yes or No - If you select Yes you are confirming that you took full body photographs

## 2023-03-24 NOTE — PROGRESS NOTES
Esthela Fleming  : 1970 [facility] [address] [place] [address]  [phone]   [phone]       OUTPATIENT PHYSICAL THERAPY:Initial Assessment 2017    ICD-10: Treatment Diagnosis: low back pain (M54.5); Spondylosis without myelopathy or radiculopathy, lumbar region  Precautions/Allergies:   Review of patient's allergies indicates no known allergies. Fall Risk Score: 1 (? 5 = High Risk)  MD Orders: evaluate and treat  MEDICAL/REFERRING DIAGNOSIS:  Other intervertebral disc degeneration, lumbar region [M51.36]   DATE OF ONSET: May 2016   REFERRING PHYSICIAN: Laird Litten, MD  RETURN PHYSICIAN APPOINTMENT: mid-September      INITIAL ASSESSMENT:  Mr. Thong Littlejohn is a  48year old male with low back pain that began in May 2016 with no specific mechanism or injury. He presents to PT with extension sensitive low back pain with decreased hip flexor mobility and decreased lumbar joint mobility. As a result, he reports pain with bending over and squatting activities. He requires skilled PT to address above listed impairments and functional limitations to facilitate return to prior level of function. PROBLEM LIST (Impacting functional limitations):  1. Increased Pain  2. Decreased Activity Tolerance  3. Decreased Flexibility/Joint Mobility  4. Decreased Lipscomb with Home Exercise Program INTERVENTIONS PLANNED:  1. Cryotherapy  2. Heat  3. Home Exercise Program (HEP)  4. Neuromuscular Re-education/Strengthening  5. Range of Motion (ROM)  6. Therapeutic Activites  7. Therapeutic Exercise/Strengthening   TREATMENT PLAN:  Effective Dates: 17 TO 17. Frequency/Duration: 2 times a week for 6 weeks  GOALS: (Goals have been discussed and agreed upon with patient.)  Discharge Goals: Time Frame: 17  1. Pt will report minimal to no pain (0-1/10) upon waking in the AM with performance of his HEP.   2. Pt will report no limitation due to pain with bending or squatting to perform hobby activities (planing wood) or dressing (putting on pants). 3. Pt will demonstrate functional improvement with MARIA INES to 6% or less. Rehabilitation Potential For Stated Goals: Good  Regarding Sabiha Lombardo therapy, I certify that the treatment plan above will be carried out by a therapist or under their direction. Thank you for this referral,  Katiana Boykin, PT     Referring Physician Signature: Gary Parekh MD              Date                      HISTORY:   History of Present Injury/Illness (Reason for Referral):  8/17/17: Pt reports low back pain that began in May 2016 with no specific mechanism or injury. He reports having tried increasing activity level with beginning an interval workout program and increasing his core exercises with no improvement in pain. He reports pain is worst when he wakes in the AM and with bending/squatting activities. He reports that pain in the AM (4/10) improves by about 9:00 am but that he has trouble bending over to put his pants on as a result of his pain. He reports that pain decreases to 0-1/10 during the day unless he performs an aggravating activity such as bending down to perform wood planing and then it takes 1 hour to aggravate pain and about 20-30 minutes to alleviate after he stops the activity. He reports no increased symptoms with workout, running, or work activities. Past Medical History/Comorbidities:   Mr. Cade Barnett  has a past medical history of GERD (gastroesophageal reflux disease) and RLS (restless legs syndrome). Mr. Cade Barnett  has a past surgical history that includes vasectomy (2005); heent (1998); and refractive surgery. Social History/Living Environment:     Pt lives in private home with spouse and 4 kids. Prior Level of Function/Work/Activity:  Pt works as  with job requiring desk work and walking around.    Current Medications:       Current Outpatient Prescriptions:     sertraline (ZOLOFT) 50 mg tablet, Take 1 Tab by mouth daily. , Disp: 30 Tab, Rfl: 3    clonazePAM (KLONOPIN) 0.5 mg tablet, 1 tab PO BID Prn restless legs, Disp: 60 Tab, Rfl: 5    pramipexole (MIRAPEX) 0.5 mg tablet, Take 1 Tab by mouth three (3) times daily. , Disp: 90 Tab, Rfl: 1    meloxicam (MOBIC) 15 mg tablet, Take 1 Tab by mouth daily. , Disp: 30 Tab, Rfl: 1    selenium sulfide (SELSUN BLUE) 1 % shampoo, Use as needed for scalp, Disp: 325 mL, Rfl: 5   Date Last Reviewed:  8/17/2017     Number of Personal Factors/Comorbidities that affect the Plan of Care: 0: LOW COMPLEXITY   EXAMINATION:   Observation/Palpation: Hypomobile and painful with PA  To L3-5. Decreased hip flexor length bilaterally with reproduction of pain during testing     OM:       Flexion WNL--pain that decreased with repetition   Extension WNL--pain that increased with repetition   SBL WNL   SBR WNL   Rot L WNL   Rot R  WNL     Strength: WNL LQS, Poor core control demonstrated during squat and demonstration of cat/camel     Neurological Screen: Reflexes not tested , SLR negative , Slump negative     (SB=sidebending, Rot=rotation, TTP=tender to palpation, SLR=straight leg raise, LQS=lower quarter scan, WNL=within normal limits; ROM measured in degrees)     Body Structures Involved:  1. Joints  2. Muscles Body Functions Affected:  1. Sensory/Pain  2. Neuromusculoskeletal  3. Movement Related Activities and Participation Affected:  1. Learning and Applying Knowledge  2. General Tasks and Demands  3. Self Care   Number of elements (examined above) that affect the Plan of Care: 4+: HIGH COMPLEXITY   CLINICAL PRESENTATION:   Presentation: Stable and uncomplicated: LOW COMPLEXITY   CLINICAL DECISION MAKING:   Outcome Measure:    Tool Used: Modified Oswestry Low Back Pain Questionnaire  Score:  Initial: 7/50 (14% disability)   Most Recent: X/50 (Date: -- )   Interpretation of Score: Each section is scored on a 0-5 scale, 5 representing the greatest disability. The scores of each section are added together for a total score of 50. Medical Necessity:   · Patient is expected to demonstrate progress in strength and range of motion to increase independence with bending . Reason for Services/Other Comments:  · Patient continues to require present interventions due to patient's inability to bend and lift without pain . Use of outcome tool(s) and clinical judgement create a POC that gives a: Clear prediction of patient's progress: LOW COMPLEXITY            TREATMENT:   (In addition to Assessment/Re-Assessment sessions the following treatments were rendered)  Pre-treatment Symptoms/Complaints:  Pt reports minimal pain at present. Pain: Initial: Pain Intensity 1: 1  Post Session:  0/10      Therapeutic Exercise: ( ):  Exercises per grid below to improve mobility and strength. Required minimal verbal cues to promote proper body mechanics. Progressed resistance, range and repetitions as indicated. 8/17/17     Activity/Exercise Parameters               Parameters Parameters   Patient education Plan of care, expectations, HEP with KTC, LE rocking, and hip flexor stretch                                                                                                                                                  Treatment/Session Assessment:    · Response to Treatment:  Good tolerance with HEP exercises. · Compliance with Program/Exercises: compliant most of the time. · Recommendations/Intent for next treatment session: \"Next visit will focus on advancements to more challenging activities\".   Total Treatment Duration:  PT Patient Time In/Time Out  Time In: 0730  Time Out: 0830    Tess Bernal PT no

## 2023-05-05 ENCOUNTER — APPOINTMENT (RX ONLY)
Dept: URBAN - METROPOLITAN AREA CLINIC 330 | Facility: CLINIC | Age: 53
Setting detail: DERMATOLOGY
End: 2023-05-05

## 2023-05-05 DIAGNOSIS — L73.8 OTHER SPECIFIED FOLLICULAR DISORDERS: ICD-10-CM

## 2023-05-05 PROBLEM — C44.719 BASAL CELL CARCINOMA OF SKIN OF LEFT LOWER LIMB, INCLUDING HIP: Status: ACTIVE | Noted: 2023-05-05

## 2023-05-05 PROCEDURE — 99212 OFFICE O/P EST SF 10 MIN: CPT | Mod: 25

## 2023-05-05 PROCEDURE — ? CURETTAGE AND DESTRUCTION

## 2023-05-05 PROCEDURE — ? ADDITIONAL NOTES

## 2023-05-05 PROCEDURE — ? COUNSELING

## 2023-05-05 PROCEDURE — 17262 DSTRJ MAL LES T/A/L 1.1-2.0: CPT

## 2023-05-05 ASSESSMENT — LOCATION DETAILED DESCRIPTION DERM: LOCATION DETAILED: RIGHT MEDIAL FOREHEAD

## 2023-05-05 ASSESSMENT — LOCATION SIMPLE DESCRIPTION DERM: LOCATION SIMPLE: RIGHT FOREHEAD

## 2023-05-05 ASSESSMENT — LOCATION ZONE DERM: LOCATION ZONE: FACE

## 2023-05-05 NOTE — PROCEDURE: CURETTAGE AND DESTRUCTION

## 2023-05-05 NOTE — PROCEDURE: ADDITIONAL NOTES
Render Risk Assessment In Note?: no
Detail Level: Detailed
Additional Notes: Two lesions were treated last visit with cautery. Pt states they healed well and he is interested in treating more of them. Quoted $115 for up to 15 lesions. Pt will schedule.

## 2023-10-04 ENCOUNTER — OFFICE VISIT (OUTPATIENT)
Dept: OCCUPATIONAL MEDICINE | Age: 53
End: 2023-10-04

## 2023-10-04 DIAGNOSIS — Z01.89 ROUTINE LAB DRAW: ICD-10-CM

## 2023-10-04 DIAGNOSIS — R73.9 BLOOD GLUCOSE ELEVATED: Primary | ICD-10-CM

## 2023-10-04 PROBLEM — S62.202G: Status: ACTIVE | Noted: 2022-09-21

## 2023-10-04 PROBLEM — K40.90 NON-RECURRENT UNILATERAL INGUINAL HERNIA WITHOUT OBSTRUCTION OR GANGRENE: Status: ACTIVE | Noted: 2020-11-10

## 2023-10-04 RX ORDER — PRAMIPEXOLE DIHYDROCHLORIDE 0.5 MG/1
0.5 TABLET ORAL 3 TIMES DAILY
COMMUNITY

## 2023-10-04 RX ORDER — OMEPRAZOLE 20 MG/1
40 CAPSULE, DELAYED RELEASE ORAL DAILY
COMMUNITY

## 2023-10-04 ASSESSMENT — ENCOUNTER SYMPTOMS
COUGH: 0
BLURRED VISION: 0

## 2023-10-04 NOTE — PROGRESS NOTES
PROGRESS NOTE    SUBJECTIVE:   Franca Chan is a 52 y.o. male seen for a visit regarding Recheck A1C after last lab showed slight elevation    Comes to the clinic for blood draw to recheck HgbA1C. His last Al1 was 5.7. Since that time, he has changed his diet, been exercising regularly and lost 20 lbs. He denies any c/o today. He is scheduled to see his PCP in about 2 weeks. Review of Systems   Constitutional: Negative for chills, fever and malaise/fatigue. Eyes:  Negative for blurred vision. Cardiovascular:  Negative for chest pain. Respiratory:  Negative for cough. Current Outpatient Medications   Medication Sig Dispense Refill    omeprazole (PRILOSEC) 20 MG delayed release capsule Take 2 capsules by mouth daily      pramipexole (MIRAPEX) 0.5 MG tablet Take 1 tablet by mouth 3 times daily       No current facility-administered medications for this visit. Allergies   Allergen Reactions    Poison Ivy Extract Rash     Patient Active Problem List   Diagnosis    Esophageal reflux    Hiatal hernia    Snoring    Family history of malignant neoplasm of prostate    Generalized anxiety disorder    Restless leg syndrome    Seborrheic dermatitis of scalp    Non-recurrent unilateral inguinal hernia without obstruction or gangrene    Closed fracture of first metacarpal bone of left hand with delayed healing     No past medical history on file. No past surgical history on file. No family history on file. Social History     Tobacco Use    Smoking status: Not on file    Smokeless tobacco: Not on file   Substance Use Topics    Alcohol use: Not on file       Past Medical History, Past Surgical History, Family history, Social History, and Medications were all reviewed with the patient today and updated as necessary. OBJECTIVE:  There were no vitals taken for this visit. Physical Exam  Constitutional:       General: He is not in acute distress. Appearance: Normal appearance.  He is

## 2023-10-05 LAB — HBA1C MFR BLD: 5.6 % (ref 4.8–5.6)

## 2024-03-01 ENCOUNTER — APPOINTMENT (RX ONLY)
Dept: URBAN - METROPOLITAN AREA CLINIC 330 | Facility: CLINIC | Age: 54
Setting detail: DERMATOLOGY
End: 2024-03-01

## 2024-03-01 DIAGNOSIS — L82.0 INFLAMED SEBORRHEIC KERATOSIS: ICD-10-CM

## 2024-03-01 DIAGNOSIS — Z71.89 OTHER SPECIFIED COUNSELING: ICD-10-CM

## 2024-03-01 DIAGNOSIS — L73.8 OTHER SPECIFIED FOLLICULAR DISORDERS: ICD-10-CM

## 2024-03-01 DIAGNOSIS — L90.5 SCAR CONDITIONS AND FIBROSIS OF SKIN: ICD-10-CM

## 2024-03-01 DIAGNOSIS — L82.1 OTHER SEBORRHEIC KERATOSIS: ICD-10-CM

## 2024-03-01 DIAGNOSIS — D22 MELANOCYTIC NEVI: ICD-10-CM

## 2024-03-01 DIAGNOSIS — L57.8 OTHER SKIN CHANGES DUE TO CHRONIC EXPOSURE TO NONIONIZING RADIATION: ICD-10-CM

## 2024-03-01 DIAGNOSIS — Z85.828 PERSONAL HISTORY OF OTHER MALIGNANT NEOPLASM OF SKIN: ICD-10-CM

## 2024-03-01 PROBLEM — D22.5 MELANOCYTIC NEVI OF TRUNK: Status: ACTIVE | Noted: 2024-03-01

## 2024-03-01 PROCEDURE — 99213 OFFICE O/P EST LOW 20 MIN: CPT | Mod: 25

## 2024-03-01 PROCEDURE — ? BODY PHOTOGRAPHY

## 2024-03-01 PROCEDURE — ? LIQUID NITROGEN

## 2024-03-01 PROCEDURE — ? COUNSELING

## 2024-03-01 PROCEDURE — ? EDUCATIONAL RESOURCES PROVIDED

## 2024-03-01 PROCEDURE — ? ADDITIONAL NOTES

## 2024-03-01 PROCEDURE — 17110 DESTRUCTION B9 LES UP TO 14: CPT

## 2024-03-01 PROCEDURE — ? TREATMENT REGIMEN

## 2024-03-01 PROCEDURE — ? MEDICAL PHOTOGRAPHY REVIEW

## 2024-03-01 PROCEDURE — ? FULL BODY SKIN EXAM

## 2024-03-01 ASSESSMENT — LOCATION ZONE DERM
LOCATION ZONE: TRUNK
LOCATION ZONE: LEG
LOCATION ZONE: FACE

## 2024-03-01 ASSESSMENT — LOCATION SIMPLE DESCRIPTION DERM
LOCATION SIMPLE: GROIN
LOCATION SIMPLE: RIGHT UPPER BACK
LOCATION SIMPLE: LEFT CHEEK
LOCATION SIMPLE: RIGHT FOREHEAD
LOCATION SIMPLE: UPPER BACK
LOCATION SIMPLE: LEFT THIGH
LOCATION SIMPLE: RIGHT THIGH
LOCATION SIMPLE: LEFT UPPER BACK

## 2024-03-01 ASSESSMENT — LOCATION DETAILED DESCRIPTION DERM
LOCATION DETAILED: RIGHT SUPERIOR UPPER BACK
LOCATION DETAILED: INFERIOR THORACIC SPINE
LOCATION DETAILED: RIGHT POSTERIOR LATERAL DISTAL THIGH
LOCATION DETAILED: SUPRAPUBIC SKIN
LOCATION DETAILED: RIGHT MEDIAL FOREHEAD
LOCATION DETAILED: LEFT ANTERIOR DISTAL THIGH
LOCATION DETAILED: LEFT INFERIOR CENTRAL MALAR CHEEK
LOCATION DETAILED: LEFT MEDIAL UPPER BACK

## 2024-03-01 NOTE — PROCEDURE: ADDITIONAL NOTES
Render Risk Assessment In Note?: no
Detail Level: Detailed
Additional Notes: Two lesions were treated last visit with cautery. Pt states they healed well and he is interested in treating more of them. Quoted $115 for up to 15 lesions. Pt will schedule.
Detail Level: Simple
Additional Notes: ED&C of [facility] 2023.

## 2024-03-01 NOTE — PROCEDURE: LIQUID NITROGEN
Render Note In Bullet Format When Appropriate: No
Medical Necessity Clause: This procedure was medically necessary because the lesions that were treated were:
Number Of Freeze-Thaw Cycles: 1 freeze-thaw cycle
Detail Level: Detailed
Show Spray Paint Technique Variable?: Yes
Post-Care Instructions: I reviewed with the patient in detail post-care instructions. Patient is to wear sunprotection, and avoid picking at any of the treated lesions. Pt may apply Vaseline to crusted or scabbing areas.
Spray Paint Text: The liquid nitrogen was applied to the skin utilizing a spray paint frosting technique.
Medical Necessity Information: It is in your best interest to select a reason for this procedure from the list below. All of these items fulfill various CMS LCD requirements except the new and changing color options.
Consent: The patient's consent was obtained including but not limited to risks of crusting, scabbing, blistering, scarring, darker or lighter pigmentary change, recurrence, incomplete removal and infection.

## 2024-07-17 ENCOUNTER — OFFICE VISIT (OUTPATIENT)
Age: 54
End: 2024-07-17

## 2024-07-17 DIAGNOSIS — Z00.00 BLOOD TESTS FOR ROUTINE GENERAL PHYSICAL EXAMINATION: Primary | ICD-10-CM

## 2024-07-17 ASSESSMENT — ENCOUNTER SYMPTOMS
BLURRED VISION: 0
COUGH: 0

## 2024-07-17 NOTE — PROGRESS NOTES
PROGRESS NOTE    SUBJECTIVE:   Zia Walter is a 53 y.o. male seen for a visit regarding fasting labs    Comes to the clinic for fasting labs. He states that he is feeling well today and denies any complaints.        Review of Systems   Constitutional: Negative for chills, fever and malaise/fatigue.   HENT:  Negative for congestion.    Eyes:  Negative for blurred vision.   Cardiovascular:  Negative for chest pain.   Respiratory:  Negative for cough.    Neurological:  Negative for headaches.       Current Outpatient Medications   Medication Sig Dispense Refill    omeprazole (PRILOSEC) 20 MG delayed release capsule Take 2 capsules by mouth daily      pramipexole (MIRAPEX) 0.5 MG tablet Take 1 tablet by mouth 3 times daily       No current facility-administered medications for this visit.     Allergies   Allergen Reactions    Poison Ivy Extract Rash     Patient Active Problem List   Diagnosis    Esophageal reflux    Hiatal hernia    Snoring    Family history of malignant neoplasm of prostate    Generalized anxiety disorder    Restless leg syndrome    Seborrheic dermatitis of scalp    Non-recurrent unilateral inguinal hernia without obstruction or gangrene    Closed fracture of first metacarpal bone of left hand with delayed healing     No past medical history on file.  No past surgical history on file.  No family history on file.  Social History     Tobacco Use    Smoking status: Not on file    Smokeless tobacco: Not on file   Substance Use Topics    Alcohol use: Not on file       Past Medical History, Past Surgical History, Family history, Social History, and Medications were all reviewed with the patient today and updated as necessary.     OBJECTIVE:  There were no vitals taken for this visit.     Physical Exam  Constitutional:       General: He is not in acute distress.     Appearance: Normal appearance. He is normal weight. He is not ill-appearing or toxic-appearing.   Pulmonary:      Effort: Pulmonary

## 2024-07-18 LAB
ALBUMIN SERPL-MCNC: 4.3 G/DL (ref 3.8–4.9)
ALP SERPL-CCNC: 49 IU/L (ref 44–121)
ALT SERPL-CCNC: 15 IU/L (ref 0–44)
AST SERPL-CCNC: 16 IU/L (ref 0–40)
BASOPHILS # BLD AUTO: 0 X10E3/UL (ref 0–0.2)
BASOPHILS NFR BLD AUTO: 0 %
BILIRUB SERPL-MCNC: 0.4 MG/DL (ref 0–1.2)
BUN SERPL-MCNC: 20 MG/DL (ref 6–24)
BUN/CREAT SERPL: 18 (ref 9–20)
CALCIUM SERPL-MCNC: 9.2 MG/DL (ref 8.7–10.2)
CHLORIDE SERPL-SCNC: 102 MMOL/L (ref 96–106)
CHOLEST SERPL-MCNC: 187 MG/DL (ref 100–199)
CHOLEST/HDLC SERPL: 4.7 RATIO (ref 0–5)
CO2 SERPL-SCNC: 25 MMOL/L (ref 20–29)
CREAT SERPL-MCNC: 1.11 MG/DL (ref 0.76–1.27)
EGFRCR SERPLBLD CKD-EPI 2021: 78 ML/MIN/1.73
EOSINOPHIL # BLD AUTO: 0.2 X10E3/UL (ref 0–0.4)
EOSINOPHIL NFR BLD AUTO: 3 %
ERYTHROCYTE [DISTWIDTH] IN BLOOD BY AUTOMATED COUNT: 12.5 % (ref 11.6–15.4)
GLOBULIN SER CALC-MCNC: 2.3 G/DL (ref 1.5–4.5)
GLUCOSE SERPL-MCNC: 108 MG/DL (ref 70–99)
HCT VFR BLD AUTO: 44.5 % (ref 37.5–51)
HDLC SERPL-MCNC: 40 MG/DL
HGB BLD-MCNC: 15 G/DL (ref 13–17.7)
IMM GRANULOCYTES # BLD AUTO: 0 X10E3/UL (ref 0–0.1)
IMM GRANULOCYTES NFR BLD AUTO: 0 %
LDLC SERPL CALC-MCNC: 128 MG/DL (ref 0–99)
LYMPHOCYTES # BLD AUTO: 1.5 X10E3/UL (ref 0.7–3.1)
LYMPHOCYTES NFR BLD AUTO: 30 %
MCH RBC QN AUTO: 32.1 PG (ref 26.6–33)
MCHC RBC AUTO-ENTMCNC: 33.7 G/DL (ref 31.5–35.7)
MCV RBC AUTO: 95 FL (ref 79–97)
MONOCYTES # BLD AUTO: 0.5 X10E3/UL (ref 0.1–0.9)
MONOCYTES NFR BLD AUTO: 10 %
NEUTROPHILS # BLD AUTO: 2.9 X10E3/UL (ref 1.4–7)
NEUTROPHILS NFR BLD AUTO: 57 %
PLATELET # BLD AUTO: 270 X10E3/UL (ref 150–450)
POTASSIUM SERPL-SCNC: 4.5 MMOL/L (ref 3.5–5.2)
PROT SERPL-MCNC: 6.6 G/DL (ref 6–8.5)
RBC # BLD AUTO: 4.67 X10E6/UL (ref 4.14–5.8)
SODIUM SERPL-SCNC: 140 MMOL/L (ref 134–144)
TESTOST SERPL-MCNC: 768 NG/DL (ref 264–916)
TRIGL SERPL-MCNC: 103 MG/DL (ref 0–149)
TSH SERPL DL<=0.005 MIU/L-ACNC: 1.33 UIU/ML (ref 0.45–4.5)
VLDLC SERPL CALC-MCNC: 19 MG/DL (ref 5–40)
WBC # BLD AUTO: 5.2 X10E3/UL (ref 3.4–10.8)

## 2024-07-22 ENCOUNTER — OFFICE VISIT (OUTPATIENT)
Age: 54
End: 2024-07-22

## 2024-07-22 DIAGNOSIS — Z71.9 HEALTH EDUCATION/COUNSELING: Primary | ICD-10-CM

## 2024-07-22 NOTE — PROGRESS NOTES
PROGRESS NOTE    SUBJECTIVE:   Zia Walter is a 53 y.o. male seen for a visit regarding yearly lab review     Comes to the clinic to review labs drawn on 7.17.24  Denies any complaint of at this time.         Review of Systems   Constitutional: Negative for chills, fever and malaise/fatigue.   Cardiovascular:  Negative for chest pain.       Current Outpatient Medications   Medication Sig Dispense Refill    omeprazole (PRILOSEC) 20 MG delayed release capsule Take 2 capsules by mouth daily      pramipexole (MIRAPEX) 0.5 MG tablet Take 1 tablet by mouth 3 times daily       No current facility-administered medications for this visit.     Allergies   Allergen Reactions    Poison Ivy Extract Rash     Patient Active Problem List   Diagnosis    Esophageal reflux    Hiatal hernia    Snoring    Family history of malignant neoplasm of prostate    Generalized anxiety disorder    Restless leg syndrome    Seborrheic dermatitis of scalp    Non-recurrent unilateral inguinal hernia without obstruction or gangrene    Closed fracture of first metacarpal bone of left hand with delayed healing     No past medical history on file.  No past surgical history on file.  No family history on file.  Social History     Tobacco Use    Smoking status: Not on file    Smokeless tobacco: Not on file   Substance Use Topics    Alcohol use: Not on file       Past Medical History, Past Surgical History, Family history, Social History, and Medications were all reviewed with the patient today and updated as necessary.     OBJECTIVE:  There were no vitals taken for this visit.     Physical Exam  Constitutional:       General: He is not in acute distress.     Appearance: Normal appearance. He is normal weight. He is not ill-appearing or toxic-appearing.   HENT:      Head: Normocephalic and atraumatic.   Pulmonary:      Effort: Pulmonary effort is normal.   Musculoskeletal:      Cervical back: Normal range of motion.   Neurological:      Mental